# Patient Record
Sex: MALE | Race: WHITE | HISPANIC OR LATINO | Employment: FULL TIME | URBAN - METROPOLITAN AREA
[De-identification: names, ages, dates, MRNs, and addresses within clinical notes are randomized per-mention and may not be internally consistent; named-entity substitution may affect disease eponyms.]

---

## 2018-06-09 ENCOUNTER — APPOINTMENT (EMERGENCY)
Dept: CT IMAGING | Facility: HOSPITAL | Age: 48
End: 2018-06-09
Payer: COMMERCIAL

## 2018-06-09 ENCOUNTER — HOSPITAL ENCOUNTER (EMERGENCY)
Facility: HOSPITAL | Age: 48
Discharge: HOME | End: 2018-06-09
Attending: EMERGENCY MEDICINE | Admitting: EMERGENCY MEDICINE
Payer: COMMERCIAL

## 2018-06-09 VITALS
TEMPERATURE: 97.6 F | BODY MASS INDEX: 31.39 KG/M2 | OXYGEN SATURATION: 96 % | DIASTOLIC BLOOD PRESSURE: 57 MMHG | HEIGHT: 67 IN | SYSTOLIC BLOOD PRESSURE: 105 MMHG | HEART RATE: 74 BPM | RESPIRATION RATE: 16 BRPM | WEIGHT: 200 LBS

## 2018-06-09 DIAGNOSIS — R07.9 LEFT SIDED CHEST PAIN: Primary | ICD-10-CM

## 2018-06-09 LAB
ALBUMIN SERPL BCP-MCNC: 3.8 G/DL (ref 3.5–5)
ALP SERPL-CCNC: 64 U/L (ref 46–116)
ALT SERPL W P-5'-P-CCNC: 31 U/L (ref 12–78)
ANION GAP BLD CALC-SCNC: 18 MMOL/L (ref 4–13)
ANION GAP SERPL CALCULATED.3IONS-SCNC: 10 MMOL/L (ref 4–13)
APTT PPP: 33 SECONDS (ref 24–36)
AST SERPL W P-5'-P-CCNC: 22 U/L (ref 5–45)
BASOPHILS # BLD AUTO: 0.09 THOUSANDS/ΜL (ref 0–0.1)
BASOPHILS NFR BLD AUTO: 1 % (ref 0–1)
BILIRUB SERPL-MCNC: 0.4 MG/DL (ref 0.2–1)
BUN BLD-MCNC: 16 MG/DL (ref 5–25)
BUN SERPL-MCNC: 17 MG/DL (ref 5–25)
CA-I BLD-SCNC: 1.15 MMOL/L (ref 1.12–1.32)
CALCIUM SERPL-MCNC: 8.9 MG/DL (ref 8.3–10.1)
CHLORIDE BLD-SCNC: 99 MMOL/L (ref 100–108)
CHLORIDE SERPL-SCNC: 100 MMOL/L (ref 100–108)
CO2 SERPL-SCNC: 25 MMOL/L (ref 21–32)
CREAT BLD-MCNC: 1.1 MG/DL (ref 0.6–1.3)
CREAT SERPL-MCNC: 1.21 MG/DL (ref 0.6–1.3)
EOSINOPHIL # BLD AUTO: 0.22 THOUSAND/ΜL (ref 0–0.61)
EOSINOPHIL NFR BLD AUTO: 2 % (ref 0–6)
ERYTHROCYTE [DISTWIDTH] IN BLOOD BY AUTOMATED COUNT: 13 % (ref 11.6–15.1)
GFR SERPL CREATININE-BSD FRML MDRD: 71 ML/MIN/1.73SQ M
GFR SERPL CREATININE-BSD FRML MDRD: 80 ML/MIN/1.73SQ M
GLUCOSE SERPL-MCNC: 177 MG/DL (ref 65–140)
GLUCOSE SERPL-MCNC: 177 MG/DL (ref 65–140)
HCT VFR BLD AUTO: 44 % (ref 36.5–49.3)
HCT VFR BLD CALC: 45 % (ref 36.5–49.3)
HGB BLD-MCNC: 14.7 G/DL (ref 12–17)
HGB BLDA-MCNC: 15.3 G/DL (ref 12–17)
IMM GRANULOCYTES # BLD AUTO: 0.05 THOUSAND/UL (ref 0–0.2)
IMM GRANULOCYTES NFR BLD AUTO: 0 % (ref 0–2)
INR PPP: 1.02 (ref 0.86–1.17)
LIPASE SERPL-CCNC: 62 U/L (ref 73–393)
LYMPHOCYTES # BLD AUTO: 3.04 THOUSANDS/ΜL (ref 0.6–4.47)
LYMPHOCYTES NFR BLD AUTO: 25 % (ref 14–44)
MAGNESIUM SERPL-MCNC: 1.7 MG/DL (ref 1.6–2.6)
MCH RBC QN AUTO: 28.2 PG (ref 26.8–34.3)
MCHC RBC AUTO-ENTMCNC: 33.4 G/DL (ref 31.4–37.4)
MCV RBC AUTO: 85 FL (ref 82–98)
MONOCYTES # BLD AUTO: 0.73 THOUSAND/ΜL (ref 0.17–1.22)
MONOCYTES NFR BLD AUTO: 6 % (ref 4–12)
NEUTROPHILS # BLD AUTO: 8.08 THOUSANDS/ΜL (ref 1.85–7.62)
NEUTS SEG NFR BLD AUTO: 66 % (ref 43–75)
NRBC BLD AUTO-RTO: 0 /100 WBCS
PCO2 BLD: 25 MMOL/L (ref 21–32)
PLATELET # BLD AUTO: 301 THOUSANDS/UL (ref 149–390)
PMV BLD AUTO: 10.1 FL (ref 8.9–12.7)
POTASSIUM BLD-SCNC: 3.4 MMOL/L (ref 3.5–5.3)
POTASSIUM SERPL-SCNC: 3.3 MMOL/L (ref 3.5–5.3)
PROT SERPL-MCNC: 7.1 G/DL (ref 6.4–8.2)
PROTHROMBIN TIME: 13.3 SECONDS (ref 11.8–14.2)
RBC # BLD AUTO: 5.21 MILLION/UL (ref 3.88–5.62)
SODIUM BLD-SCNC: 137 MMOL/L (ref 136–145)
SODIUM SERPL-SCNC: 135 MMOL/L (ref 136–145)
SPECIMEN SOURCE: ABNORMAL
SPECIMEN SOURCE: NORMAL
TROPONIN I BLD-MCNC: 0 NG/ML (ref 0–0.08)
TROPONIN I SERPL-MCNC: <0.02 NG/ML
TROPONIN I SERPL-MCNC: <0.02 NG/ML
WBC # BLD AUTO: 12.21 THOUSAND/UL (ref 4.31–10.16)

## 2018-06-09 PROCEDURE — 84484 ASSAY OF TROPONIN QUANT: CPT

## 2018-06-09 PROCEDURE — 85014 HEMATOCRIT: CPT

## 2018-06-09 PROCEDURE — 96361 HYDRATE IV INFUSION ADD-ON: CPT

## 2018-06-09 PROCEDURE — 36415 COLL VENOUS BLD VENIPUNCTURE: CPT | Performed by: EMERGENCY MEDICINE

## 2018-06-09 PROCEDURE — 80047 BASIC METABLC PNL IONIZED CA: CPT

## 2018-06-09 PROCEDURE — 96375 TX/PRO/DX INJ NEW DRUG ADDON: CPT

## 2018-06-09 PROCEDURE — 93005 ELECTROCARDIOGRAM TRACING: CPT

## 2018-06-09 PROCEDURE — 71275 CT ANGIOGRAPHY CHEST: CPT

## 2018-06-09 PROCEDURE — 96374 THER/PROPH/DIAG INJ IV PUSH: CPT

## 2018-06-09 PROCEDURE — 85610 PROTHROMBIN TIME: CPT | Performed by: EMERGENCY MEDICINE

## 2018-06-09 PROCEDURE — 99285 EMERGENCY DEPT VISIT HI MDM: CPT

## 2018-06-09 PROCEDURE — 83690 ASSAY OF LIPASE: CPT | Performed by: EMERGENCY MEDICINE

## 2018-06-09 PROCEDURE — 83735 ASSAY OF MAGNESIUM: CPT | Performed by: EMERGENCY MEDICINE

## 2018-06-09 PROCEDURE — 80053 COMPREHEN METABOLIC PANEL: CPT | Performed by: EMERGENCY MEDICINE

## 2018-06-09 PROCEDURE — 74175 CTA ABDOMEN W/CONTRAST: CPT

## 2018-06-09 PROCEDURE — 84484 ASSAY OF TROPONIN QUANT: CPT | Performed by: EMERGENCY MEDICINE

## 2018-06-09 PROCEDURE — 85025 COMPLETE CBC W/AUTO DIFF WBC: CPT | Performed by: EMERGENCY MEDICINE

## 2018-06-09 PROCEDURE — 85730 THROMBOPLASTIN TIME PARTIAL: CPT | Performed by: EMERGENCY MEDICINE

## 2018-06-09 RX ORDER — POTASSIUM CHLORIDE 20 MEQ/1
40 TABLET, EXTENDED RELEASE ORAL ONCE
Status: COMPLETED | OUTPATIENT
Start: 2018-06-09 | End: 2018-06-09

## 2018-06-09 RX ORDER — ASPIRIN 81 MG/1
324 TABLET, CHEWABLE ORAL ONCE
Status: COMPLETED | OUTPATIENT
Start: 2018-06-09 | End: 2018-06-09

## 2018-06-09 RX ORDER — ONDANSETRON 2 MG/ML
4 INJECTION INTRAMUSCULAR; INTRAVENOUS ONCE
Status: COMPLETED | OUTPATIENT
Start: 2018-06-09 | End: 2018-06-09

## 2018-06-09 RX ADMIN — IOHEXOL 100 ML: 350 INJECTION, SOLUTION INTRAVENOUS at 14:03

## 2018-06-09 RX ADMIN — POTASSIUM CHLORIDE 40 MEQ: 1500 TABLET, EXTENDED RELEASE ORAL at 14:50

## 2018-06-09 RX ADMIN — SODIUM CHLORIDE 1000 ML: 0.9 INJECTION, SOLUTION INTRAVENOUS at 14:11

## 2018-06-09 RX ADMIN — NITROGLYCERIN 1 INCH: 20 OINTMENT TOPICAL at 14:52

## 2018-06-09 RX ADMIN — ASPIRIN 81 MG 324 MG: 81 TABLET ORAL at 14:49

## 2018-06-09 RX ADMIN — HYDROMORPHONE HYDROCHLORIDE 0.5 MG: 1 INJECTION, SOLUTION INTRAMUSCULAR; INTRAVENOUS; SUBCUTANEOUS at 13:55

## 2018-06-09 RX ADMIN — ONDANSETRON 4 MG: 2 INJECTION INTRAMUSCULAR; INTRAVENOUS at 15:01

## 2018-06-09 NOTE — ED CARE HANDOFF
Emergency Department Sign Out Note        Sign out and transfer of care from Dr Justin Benavides  See Separate Emergency Department note  The patient, Carine Roldan, was evaluated by the previous provider for chest pain  Workup Completed:  Results Reviewed     Procedure Component Value Units Date/Time    Troponin I [[id number]]  (Normal) Collected:  06/09/18 1702    Lab Status:  Final result Specimen:  Blood from Arm, Right Updated:  06/09/18 1726     Troponin I <0 02 ng/mL     Narrative:         Siemens Chemistry analyzer 99% cutoff is > 0 04 ng/mL in network labs    o cTnI 99% cutoff is useful only when applied to patients in the clinical setting of myocardial ischemia  o cTnI 99% cutoff should be interpreted in the context of clinical history, ECG findings and possibly cardiac imaging to establish correct diagnosis  o cTnI 99% cutoff may be suggestive but clearly not indicative of a coronary event without the clinical setting of myocardial ischemia  Troponin I [[id number]]  (Normal) Collected:  06/09/18 1412    Lab Status:  Final result Specimen:  Blood from Arm, Right Updated:  06/09/18 1438     Troponin I <0 02 ng/mL     Narrative:         Siemens Chemistry analyzer 99% cutoff is > 0 04 ng/mL in network labs    o cTnI 99% cutoff is useful only when applied to patients in the clinical setting of myocardial ischemia  o cTnI 99% cutoff should be interpreted in the context of clinical history, ECG findings and possibly cardiac imaging to establish correct diagnosis  o cTnI 99% cutoff may be suggestive but clearly not indicative of a coronary event without the clinical setting of myocardial ischemia      Comprehensive metabolic panel [[id number]]  (Abnormal) Collected:  06/09/18 1412    Lab Status:  Final result Specimen:  Blood from Arm, Right Updated:  06/09/18 1436     Sodium 135 (L) mmol/L      Potassium 3 3 (L) mmol/L      Chloride 100 mmol/L      CO2 25 mmol/L      Anion Gap 10 mmol/L      BUN 17 mg/dL Creatinine 1 21 mg/dL      Glucose 177 (H) mg/dL      Calcium 8 9 mg/dL      AST 22 U/L      ALT 31 U/L      Alkaline Phosphatase 64 U/L      Total Protein 7 1 g/dL      Albumin 3 8 g/dL      Total Bilirubin 0 40 mg/dL      eGFR 71 ml/min/1 73sq m     Narrative:         National Kidney Disease Education Program recommendations are as follows:  GFR calculation is accurate only with a steady state creatinine  Chronic Kidney disease less than 60 ml/min/1 73 sq  meters  Kidney failure less than 15 ml/min/1 73 sq  meters      Magnesium [[id number]]  (Normal) Collected:  06/09/18 1412    Lab Status:  Final result Specimen:  Blood from Arm, Right Updated:  06/09/18 1436     Magnesium 1 7 mg/dL     Lipase [[id number]]  (Abnormal) Collected:  06/09/18 1412    Lab Status:  Final result Specimen:  Blood from Arm, Right Updated:  06/09/18 1436     Lipase 62 (L) u/L     Protime-INR [[id number]]  (Normal) Collected:  06/09/18 1412    Lab Status:  Final result Specimen:  Blood from Arm, Right Updated:  06/09/18 1431     Protime 13 3 seconds      INR 1 02    APTT [[id number]]  (Normal) Collected:  06/09/18 1412    Lab Status:  Final result Specimen:  Blood from Arm, Right Updated:  06/09/18 1431     PTT 33 seconds     CBC and differential [[id number]]  (Abnormal) Collected:  06/09/18 1412    Lab Status:  Final result Specimen:  Blood from Arm, Right Updated:  06/09/18 1419     WBC 12 21 (H) Thousand/uL      RBC 5 21 Million/uL      Hemoglobin 14 7 g/dL      Hematocrit 44 0 %      MCV 85 fL      MCH 28 2 pg      MCHC 33 4 g/dL      RDW 13 0 %      MPV 10 1 fL      Platelets 415 Thousands/uL      nRBC 0 /100 WBCs      Neutrophils Relative 66 %      Immat GRANS % 0 %      Lymphocytes Relative 25 %      Monocytes Relative 6 %      Eosinophils Relative 2 %      Basophils Relative 1 %      Neutrophils Absolute 8 08 (H) Thousands/µL      Immature Grans Absolute 0 05 Thousand/uL      Lymphocytes Absolute 3 04 Thousands/µL      Monocytes Absolute 0 73 Thousand/µL      Eosinophils Absolute 0 22 Thousand/µL      Basophils Absolute 0 09 Thousands/µL     POCT troponin [[id number]] Collected:  06/09/18 1345    Lab Status:  Final result Updated:  06/09/18 1359     POC Troponin I 0 00 ng/ml      Specimen Type VENOUS    Narrative:         A[employer]ott i-Stat handheld analyzer 99% cutoff is > 0 08ng/mL in [facility] Emergency Dep[facility]    o cTnI 99% cutoff is useful only when applied to patients in the clinical setting of myocardial ischemia  o cTnI 99% cutoff should be interpreted in the context of clinical history, ECG findings and possibly cardiac imaging to establish correct diagnosis  o cTnI 99% cutoff may be suggestive but clearly not indicative of a coronary event without the clinical setting of myocardial ischemia  POCT Chem 8+ [[id number]]  (Abnormal) Collected:  06/09/18 1346    Lab Status:  Final result Updated:  06/09/18 1351     SODIUM, I-STAT 137 mmol/l      Potassium, i-STAT 3 4 (L) mmol/L      Chloride, istat 99 (L) mmol/L      CO2, i-STAT 25 mmol/L      Anion Gap, Istat 18 (H) mmol/L      Calcium, Ionized i-STAT 1 15 mmol/L      BUN, I-STAT 16 mg/dl      Creatinine, i-STAT 1 1 mg/dl      eGFR 80 ml/min/1 73sq m      Glucose, i-STAT 177 (H) mg/dl      Hct, i-STAT 45 %      Hgb, i-STAT 15 3 g/dl      Specimen Type VENOUS    UA w Reflex to Microscopic [[id number]]     Lab Status:  No result Specimen:  Urine     Rapid drug screen, urine [[id number]]     Lab Status:  No result Specimen:  Urine         CTA dissection protocol chest and abdomen   Final Result      Normal aorta  Hepatic steatosis           Workstation performed: [id number]               ED Course / Workup Pending (followup):  Repeat serial troponin / EKG                              Procedures  MDM  Number of Diagnoses or Management Options  Left sided chest pain:   Diagnosis management comments: EKG Interpretation    Rate: 81 BPM  Rhythm: NSR  Axis: normal   Intervals: Normal, no blocks, QTc 415ms  Q waves: no  T waves: inverted in III   ST segments: no depression / elevation    Impression: non specific EKG  EKG for comparison: 6/9/18 - no change from prior obtained 3h earlier  EKG interpreted by me  Pt re-examined by myself when the EKG / trop returned  Repeat trop negative  Repeat EKG unchanged  He has had no recurrent CP while here  He feels comfortable w plan to est care w PCP, schedule stress test  Return parameters discussed re: ACS sx and pt understands  Return parameters discussed  Pt requires f/u as an outpt  Pt expresses understanding w above treatment plan  All questions answered prior to d/c  CritCare Time      Disposition  Final diagnoses:   Left sided chest pain     Time reflects when diagnosis was documented in both MDM as applicable and the Disposition within this note     Time User Action Codes Description Comment    6/9/2018  4:45 PM Shahzad Packer [R07 9] Left sided chest pain       ED Disposition     ED Disposition Condition Comment    Discharge  Jess Butcher discharge to home/self care  Condition at discharge: stable          Follow-up Information     Follow up With Specialties Details Why Contact Info Additional Information    Infolink  Call to establish care with a primary care physician [address], DO Internal Medicine Schedule an appointment as soon as possible for a visit to establish care with a primary care doctor [address] Emergency Department Emergency Medicine Go to If symptoms worsen 34 [facility] [address] [place] ED, [address], [place], [address]        There are no discharge medications for this patient  Outpatient Discharge Orders  Stress test only, exercise   Standing Status: Future  Standing Exp   Date: 06/09/22            ED Provider  Electronically Signed by     Shelia Head [place]  06/09/18 2000 [facility],[address], [place]  06/09/18 5850

## 2018-06-09 NOTE — ED NOTES
Discharge instructions reviewed, patient has no new complaints or concerns at time of discharge  Patient ambulated out of department, steady gait, vss  Patient accompanied out of department by his family        Angely Pina RN  06/09/18 5019

## 2018-06-09 NOTE — ED PROVIDER NOTES
History  Chief Complaint   Patient presents with    Chest Pain     patient presents with left sided chest pain with radiation down the left arm that started 20 minutes ago  patient also admits to being SOB  Patient is a 81-year-old male with no reported medical history, presents to the emergency department complaining of acute onset of left-sided chest pain that started about 20 minutes ago  Patient states the pain feels like an "explosion in his chest," and it radiates down the left upper extremity as well as into the left upper abdomen  He denies ever having this type of pain before  He does report feeling mildly short of breath  He also reports associated tingling in the right hand  He denies any associated diaphoresis, headache, dizziness or near syncope, visual disturbance, neck or jaw pain, palpitations, abdominal distension, recent URI symptoms, cough, hemoptysis, nausea, vomiting, diarrhea, constipation, urinary symptoms, skin rash or color change, extremity swelling or pain, extremity weakness, slurring of speech, facial asymmetry or other stroke-like symptoms  He denies taking any medications daily  He does admit to smoking cigars about once a week  He also admits that about 1 hour ago he ingested an edible marijuana candy while playing golf with friends  He denies any cocaine, amphetamine or other drug use  Denies any significant family history of cardiac disease  History provided by:  Patient and spouse   used: No        None       Past Medical History:   Diagnosis Date    Hyperlipidemia     Hypertension        History reviewed  No pertinent surgical history  History reviewed  No pertinent family history  I have reviewed and agree with the history as documented      Social History   Substance Use Topics    Smoking status: Current Every Day Smoker     Types: Cigars    Smokeless tobacco: Never Used    Alcohol use Yes      Comment: socailly         Review of Systems   Constitutional: Negative for chills, diaphoresis and fever  HENT: Negative for congestion, ear pain, rhinorrhea and sore throat  Eyes: Negative for photophobia, pain and visual disturbance  Respiratory: Negative for cough, chest tightness, shortness of breath and wheezing  Cardiovascular: Positive for chest pain  Negative for palpitations and leg swelling  Gastrointestinal: Negative for abdominal pain, constipation, diarrhea, nausea and vomiting  Genitourinary: Negative for dysuria, flank pain, frequency and hematuria  Musculoskeletal: Negative for back pain, neck pain and neck stiffness  Skin: Negative for color change, pallor and rash  Allergic/Immunologic: Negative for immunocompromised state  Neurological: Positive for numbness  Negative for dizziness, syncope, facial asymmetry, speech difficulty, weakness, light-headedness and headaches  Hematological: Negative for adenopathy  Does not bruise/bleed easily  Psychiatric/Behavioral: Negative for confusion and decreased concentration  All other systems reviewed and are negative  Physical Exam  Physical Exam   Constitutional: He is oriented to person, place, and time  He appears well-developed and well-nourished  No distress  HENT:   Head: Normocephalic and atraumatic  Mouth/Throat: Oropharynx is clear and moist  No oropharyngeal exudate  Eyes: Conjunctivae and EOM are normal  Pupils are equal, round, and reactive to light  Neck: Normal range of motion  Neck supple  No JVD present  Cardiovascular: Normal rate, regular rhythm, normal heart sounds and intact distal pulses  Exam reveals no gallop and no friction rub  No murmur heard  Pulmonary/Chest: Effort normal and breath sounds normal  No respiratory distress  He has no wheezes  He has no rales  He exhibits no tenderness  Abdominal: Soft  Bowel sounds are normal  He exhibits no distension  There is no tenderness  There is no rebound and no guarding  Musculoskeletal: Normal range of motion  He exhibits no edema or tenderness  Lymphadenopathy:     He has no cervical adenopathy  Neurological: He is alert and oriented to person, place, and time  No cranial nerve deficit  No gross motor or sensory deficits  5/5 strength throughout  Skin: Skin is warm and dry  No rash noted  He is not diaphoretic  No pallor  Psychiatric: He has a normal mood and affect  His behavior is normal    Nursing note and vitals reviewed        Vital Signs  ED Triage Vitals   Temperature Pulse Respirations Blood Pressure SpO2   06/09/18 1443 06/09/18 1339 06/09/18 1339 06/09/18 1340 06/09/18 1339   97 6 °F (36 4 °C) (!) 110 20 139/65 95 %      Temp Source Heart Rate Source Patient Position - Orthostatic VS BP Location FiO2 (%)   06/09/18 1443 06/09/18 1702 06/09/18 1530 06/09/18 1530 --   Oral Monitor Lying Right arm       Pain Score       06/09/18 1444       8         Vitals:    06/09/18 1443 06/09/18 1530 06/09/18 1702 06/09/18 1730   BP:  107/55 106/51 105/57   BP Location:  Right arm Left arm Left arm   Pulse:  78 78 74   Resp:  18 16 16   Temp: 97 6 °F (36 4 °C)      TempSrc: Oral      SpO2:  95% 96% 96%   Weight: 90 7 kg (200 lb)      Height: 5' 7" (1 702 m)        Visual Acuity      ED Medications  Medications   sodium chloride 0 9 % bolus 1,000 mL (0 mL Intravenous Stopped 6/9/18 1511)   HYDROmorphone (DILAUDID) injection 0 5 mg (0 5 mg Intravenous Given 6/9/18 1355)   iohexol (OMNIPAQUE) 350 MG/ML injection (MULTI-DOSE) 100 mL (100 mL Intravenous Given 6/9/18 1403)   aspirin chewable tablet 324 mg (324 mg Oral Given 6/9/18 1449)   nitroglycerin (NITRO-BID) 2 % TD ointment 1 inch (1 inch Topical Given 6/9/18 1452)   potassium chloride (K-DUR,KLOR-CON) CR tablet 40 mEq (40 mEq Oral Given 6/9/18 1450)   ondansetron (ZOFRAN) injection 4 mg (4 mg Intravenous Given 6/9/18 2061)       Diagnostic Studies  Results Reviewed     Procedure Component Value Units Date/Time    Troponin I [[id number]]  (Normal) Collected:  06/09/18 1702    Lab Status:  Final result Specimen:  Blood from Arm, Right Updated:  06/09/18 1726     Troponin I <0 02 ng/mL     Narrative:         [employer] Chemistry analyzer 99% cutoff is > 0 04 ng/mL in network labs    o cTnI 99% cutoff is useful only when applied to patients in the clinical setting of myocardial ischemia  o cTnI 99% cutoff should be interpreted in the context of clinical history, ECG findings and possibly cardiac imaging to establish correct diagnosis  o cTnI 99% cutoff may be suggestive but clearly not indicative of a coronary event without the clinical setting of myocardial ischemia  Troponin I [[id number]]  (Normal) Collected:  06/09/18 1412    Lab Status:  Final result Specimen:  Blood from Arm, Right Updated:  06/09/18 1438     Troponin I <0 02 ng/mL     Narrative:         [employer]ens Chemistry analyzer 99% cutoff is > 0 04 ng/mL in network labs    o cTnI 99% cutoff is useful only when applied to patients in the clinical setting of myocardial ischemia  o cTnI 99% cutoff should be interpreted in the context of clinical history, ECG findings and possibly cardiac imaging to establish correct diagnosis  o cTnI 99% cutoff may be suggestive but clearly not indicative of a coronary event without the clinical setting of myocardial ischemia      Comprehensive metabolic panel [[id number]]  (Abnormal) Collected:  06/09/18 1412    Lab Status:  Final result Specimen:  Blood from Arm, Right Updated:  06/09/18 1436     Sodium 135 (L) mmol/L      Potassium 3 3 (L) mmol/L      Chloride 100 mmol/L      CO2 25 mmol/L      Anion Gap 10 mmol/L      BUN 17 mg/dL      Creatinine 1 21 mg/dL      Glucose 177 (H) mg/dL      Calcium 8 9 mg/dL      AST 22 U/L      ALT 31 U/L      Alkaline Phosphatase 64 U/L      Total Protein 7 1 g/dL      Albumin 3 8 g/dL      Total Bilirubin 0 40 mg/dL      eGFR 71 ml/min/1 73sq m     Narrative:         National Kidney Disease Education Program recommendations are as follows:  GFR calculation is accurate only with a steady state creatinine  Chronic Kidney disease less than 60 ml/min/1 73 sq  meters  Kidney failure less than 15 ml/min/1 73 sq  meters      Magnesium [[id number]]  (Normal) Collected:  06/09/18 1412    Lab Status:  Final result Specimen:  Blood from Arm, Right Updated:  06/09/18 1436     Magnesium 1 7 mg/dL     Lipase [[id number]]  (Abnormal) Collected:  06/09/18 1412    Lab Status:  Final result Specimen:  Blood from Arm, Right Updated:  06/09/18 1436     Lipase 62 (L) u/L     Protime-INR [[id number]]  (Normal) Collected:  06/09/18 1412    Lab Status:  Final result Specimen:  Blood from Arm, Right Updated:  06/09/18 1431     Protime 13 3 seconds      INR 1 02    APTT [[id number]]  (Normal) Collected:  06/09/18 1412    Lab Status:  Final result Specimen:  Blood from Arm, Right Updated:  06/09/18 1431     PTT 33 seconds     CBC and differential [[id number]]  (Abnormal) Collected:  06/09/18 1412    Lab Status:  Final result Specimen:  Blood from Arm, Right Updated:  06/09/18 1419     WBC 12 21 (H) Thousand/uL      RBC 5 21 Million/uL      Hemoglobin 14 7 g/dL      Hematocrit 44 0 %      MCV 85 fL      MCH 28 2 pg      MCHC 33 4 g/dL      RDW 13 0 %      MPV 10 1 fL      Platelets 427 Thousands/uL      nRBC 0 /100 WBCs      Neutrophils Relative 66 %      Immat GRANS % 0 %      Lymphocytes Relative 25 %      Monocytes Relative 6 %      Eosinophils Relative 2 %      Basophils Relative 1 %      Neutrophils Absolute 8 08 (H) Thousands/µL      Immature Grans Absolute 0 05 Thousand/uL      Lymphocytes Absolute 3 04 Thousands/µL      Monocytes Absolute 0 73 Thousand/µL      Eosinophils Absolute 0 22 Thousand/µL      Basophils Absolute 0 09 Thousands/µL     POCT troponin [[id number]] Collected:  06/09/18 1345    Lab Status:  Final result Updated:  06/09/18 1359     POC Troponin I 0 00 ng/ml      Specimen Type VENOUS    Narrative:         [employer] i-Stat handheld analyzer 99% cutoff is > 0 08ng/mL in [facility] Emergency Departments    o cTnI 99% cutoff is useful only when applied to patients in the clinical setting of myocardial ischemia  o cTnI 99% cutoff should be interpreted in the context of clinical history, ECG findings and possibly cardiac imaging to establish correct diagnosis  o cTnI 99% cutoff may be suggestive but clearly not indicative of a coronary event without the clinical setting of myocardial ischemia  POCT Chem 8+ [[id number]]  (Abnormal) Collected:  06/09/18 1346    Lab Status:  Final result Updated:  06/09/18 1351     SODIUM, I-STAT 137 mmol/l      Potassium, i-STAT 3 4 (L) mmol/L      Chloride, istat 99 (L) mmol/L      CO2, i-STAT 25 mmol/L      Anion Gap, Istat 18 (H) mmol/L      Calcium, Ionized i-STAT 1 15 mmol/L      BUN, I-STAT 16 mg/dl      Creatinine, i-STAT 1 1 mg/dl      eGFR 80 ml/min/1 73sq m      Glucose, i-STAT 177 (H) mg/dl      Hct, i-STAT 45 %      Hgb, i-STAT 15 3 g/dl      Specimen Type VENOUS                 CTA dissection protocol chest and abdomen   Final Result by Kelly Ramírez MD (06/09 1436)      Normal aorta  Hepatic steatosis  Workstation performed: [id number]                    Procedures  ECG 12 Lead Documentation  Date/Time: 6/9/2018 1:58 PM  Performed by: Rod Bell  Authorized by: Rod Bell     ECG reviewed by me, the ED Provider: yes    Patient location:  ED  Previous ECG:     Previous ECG:  Unavailable  Rate:     ECG rate:  103    ECG rate assessment: tachycardic    Rhythm:     Rhythm: sinus tachycardia    Ectopy:     Ectopy: none    QRS:     QRS axis:  Normal    QRS intervals:  Normal  Conduction:     Conduction: normal    ST segments:     ST segments:  Normal  T waves:     T waves: normal             Phone Contacts  ED Phone Contact    ED Course  ED Course as of Rasta 10 0704   Sat Jun 09, 2018   1502 Patient just had an episode of vomiting  Will give 4 mg of IV Zofran    He was not feeling nauseated prior to being given the Dilaudid so likely it was from that  He does feel some relief after the nitroglycerin paste has been applied to his chest     1638 Patient reassessed and feeling a lot better  Chest pain is minimal and intermittent  Awaiting repeat troponin and EKG  Patient confirms that he does not have any prior medical problems and although his records show history of hypertension and hyperlipidemia he denies this  Will send home with a script for cardiac stress test and referral to PCP     0383 Signed patient out to TERESA Macias who is to f/u repeat troponin/EKG and if there is acute change, will admit, or if normal/unchanged, discharge with script for stress test and PCP referral                                 MDM  Number of Diagnoses or Management Options  Diagnosis management comments: 50-year-old male who presents with acute onset left chest pain radiating to abdomen and left upper extremity associated with right upper extremity paresthesia  Patient has no focal neurologic deficits  He is hemodynamically stable, not diaphoretic and in no acute distress  Differential includes anxiety, side effect from recent drug ingestion, acute coronary syndrome, aortic dissection, less likely pulmonary embolism but still in the differential, musculoskeletal pain  Will workup with cardiac labs and a CTA dissection protocol  Will give IV fluid bolus, 0 5 mg of IV Dilaudid  Patient's blood pressure currently controlled  If CTA negative, will add aspirin and nitropaste  Will likely keep for delta troponin/EKG given that patient is relatively healthy         Amount and/or Complexity of Data Reviewed  Clinical lab tests: ordered and reviewed  Tests in the radiology section of CPT®: ordered and reviewed  Tests in the medicine section of CPT®: ordered and reviewed  Obtain history from someone other than the patient: yes  Independent visualization of images, tracings, or specimens: yes      Romana Time    Disposition  Final diagnoses:   Left sided chest pain     Time reflects when diagnosis was documented in both MDM as applicable and the Disposition within this note     Time User Action Codes Description Comment    6/9/2018  4:45 PM Eric Wilson ROX Add [R07 9] Left sided chest pain       ED Disposition     ED Disposition Condition Comment    Discharge  Neil Herbert discharge to home/self care  Condition at discharge: stable          Follow-up Information     Follow up With Specialties Details Why Contact Info Additional Information    Infolink  Call to establish care with a primary care physician [address], DO Internal Medicine Schedule an appointment as soon as possible for a visit to establish care with a primary care doctor [address] Emergency Department Emergency Medicine Go to If symptoms worsen 34 [facility] [address] [place] ED, [address] [place], [place], [facility]          There are no discharge medications for this patient  Outpatient Discharge Orders  Stress test only, exercise   Standing Status: Future  Standing Exp   Date: 06/09/22         ED Provider  Electronically Signed by           Agustin Nichols DO  06/10/18 0960

## 2018-06-09 NOTE — DISCHARGE INSTRUCTIONS
Chest Pain   WHAT YOU NEED TO KNOW:   Chest pain can be caused by a range of conditions, from not serious to life-threatening  Chest pain can be a symptom of a digestive problem, such as acid reflux or a stomach ulcer  An anxiety attack or a strong emotion, such as anger, can also cause chest pain  Infection, inflammation, or a fracture in the bones or cartilage in your chest can cause pain or discomfort  Sometimes chest pain or pressure is caused by poor blood flow to your heart (angina)  Chest pain may also be caused by life-threatening conditions such as a heart attack or blood clot in your lungs  DISCHARGE INSTRUCTIONS:   Call 911 if:   · You have any of the following signs of a heart attack:      ¨ Squeezing, pressure, or pain in your chest that lasts longer than 5 minutes or returns    ¨ Discomfort or pain in your back, neck, jaw, stomach, or arm     ¨ Trouble breathing    ¨ Nausea or vomiting    ¨ Lightheadedness or a sudden cold sweat, especially with chest pain or trouble breathing    Seek care immediately if:   · You have chest discomfort that gets worse, even with medicine  · You cough or vomit blood  · Your bowel movements are black or bloody  · You cannot stop vomiting, or it hurts to swallow  Contact your healthcare provider if:   · You have questions or concerns about your condition or care  Medicines:   · Medicines  may be given to treat the cause of your chest pain  Examples include pain medicine, anxiety medicine, or medicines to increase blood flow to your heart  · Do not take certain medicines without asking your healthcare provider first   These include NSAIDs, herbal or vitamin supplements, or hormones (estrogen or progestin)  · Take your medicine as directed  Contact your healthcare provider if you think your medicine is not helping or if you have side effects  Tell him or her if you are allergic to any medicine   Keep a list of the medicines, vitamins, and herbs you take  Include the amounts, and when and why you take them  Bring the list or the pill bottles to follow-up visits  Carry your medicine list with you in case of an emergency  Follow up with your healthcare provider within 72 hours, or as directed: You may need to return for more tests to find the cause of your chest pain  You may be referred to a specialist, such as a cardiologist or gastroenterologist  Write down your questions so you remember to ask them during your visits  Healthy living tips: The following are general healthy guidelines  If your chest pain is caused by a heart problem, your healthcare provider will give you specific guidelines to follow  · Do not smoke  Nicotine and other chemicals in cigarettes and cigars can cause lung and heart damage  Ask your healthcare provider for information if you currently smoke and need help to quit  E-cigarettes or smokeless tobacco still contain nicotine  Talk to your healthcare provider before you use these products  · Eat a variety of healthy, low-fat foods  Healthy foods include fruits, vegetables, whole-grain breads, low-fat dairy products, beans, lean meats, and fish  Ask for more information about a heart healthy diet  · Ask about activity  Your healthcare provider will tell you which activities to limit or avoid  Ask when you can drive, return to work, and have sex  Ask about the best exercise plan for you  · Maintain a healthy weight  Ask your healthcare provider how much you should weigh  Ask him or her to help you create a weight loss plan if you are overweight  © 2017 [employer] Information is for End User's use only and may not be sold, redistributed or otherwise used for commercial purposes  All illustrations and images included in CareNotes® are the copyrighted property of [employer] , [employer]  or Bonilla Gallardo  The above information is an  only   It is not intended as medical advice for individual conditions or treatments  Talk to your doctor, nurse or pharmacist before following any medical regimen to see if it is safe and effective for you

## 2018-06-10 LAB
ATRIAL RATE: 103 BPM
ATRIAL RATE: 81 BPM
P AXIS: 52 DEGREES
P AXIS: 73 DEGREES
PR INTERVAL: 166 MS
PR INTERVAL: 174 MS
QRS AXIS: 52 DEGREES
QRS AXIS: 64 DEGREES
QRSD INTERVAL: 102 MS
QRSD INTERVAL: 92 MS
QT INTERVAL: 336 MS
QT INTERVAL: 358 MS
QTC INTERVAL: 415 MS
QTC INTERVAL: 440 MS
T WAVE AXIS: 17 DEGREES
T WAVE AXIS: 31 DEGREES
VENTRICULAR RATE: 103 BPM
VENTRICULAR RATE: 81 BPM

## 2018-06-10 PROCEDURE — 93010 ELECTROCARDIOGRAM REPORT: CPT | Performed by: INTERNAL MEDICINE

## 2020-12-17 ENCOUNTER — NURSE TRIAGE (OUTPATIENT)
Dept: OTHER | Facility: OTHER | Age: 50
End: 2020-12-17

## 2020-12-17 DIAGNOSIS — Z20.828 SARS-ASSOCIATED CORONAVIRUS EXPOSURE: ICD-10-CM

## 2020-12-17 DIAGNOSIS — Z20.828 SARS-ASSOCIATED CORONAVIRUS EXPOSURE: Primary | ICD-10-CM

## 2020-12-17 PROCEDURE — U0003 INFECTIOUS AGENT DETECTION BY NUCLEIC ACID (DNA OR RNA); SEVERE ACUTE RESPIRATORY SYNDROME CORONAVIRUS 2 (SARS-COV-2) (CORONAVIRUS DISEASE [COVID-19]), AMPLIFIED PROBE TECHNIQUE, MAKING USE OF HIGH THROUGHPUT TECHNOLOGIES AS DESCRIBED BY CMS-2020-01-R: HCPCS | Performed by: FAMILY MEDICINE

## 2020-12-18 LAB — SARS-COV-2 RNA SPEC QL NAA+PROBE: NOT DETECTED

## 2021-03-30 ENCOUNTER — HOSPITAL ENCOUNTER (EMERGENCY)
Facility: HOSPITAL | Age: 51
Discharge: HOME | End: 2021-03-30
Attending: EMERGENCY MEDICINE | Admitting: EMERGENCY MEDICINE
Payer: COMMERCIAL

## 2021-03-30 ENCOUNTER — APPOINTMENT (EMERGENCY)
Dept: RADIOLOGY | Facility: HOSPITAL | Age: 51
End: 2021-03-30
Payer: COMMERCIAL

## 2021-03-30 VITALS
BODY MASS INDEX: 33.11 KG/M2 | WEIGHT: 206 LBS | HEIGHT: 66 IN | TEMPERATURE: 98.7 F | OXYGEN SATURATION: 96 % | DIASTOLIC BLOOD PRESSURE: 71 MMHG | SYSTOLIC BLOOD PRESSURE: 140 MMHG | HEART RATE: 85 BPM | RESPIRATION RATE: 16 BRPM

## 2021-03-30 DIAGNOSIS — M79.631 RIGHT FOREARM PAIN: Primary | ICD-10-CM

## 2021-03-30 PROCEDURE — 73090 X-RAY EXAM OF FOREARM: CPT

## 2021-03-30 PROCEDURE — 99283 EMERGENCY DEPT VISIT LOW MDM: CPT

## 2021-03-30 PROCEDURE — 99284 EMERGENCY DEPT VISIT MOD MDM: CPT | Performed by: EMERGENCY MEDICINE

## 2021-03-30 RX ORDER — NAPROXEN 250 MG/1
250 TABLET ORAL 2 TIMES DAILY WITH MEALS
Qty: 20 TABLET | Refills: 0 | Status: SHIPPED | OUTPATIENT
Start: 2021-03-30

## 2021-03-30 NOTE — Clinical Note
Maira Villegas was seen and treated in our emergency department on 3/30/2021  No lifting more than 5 lbs with right arm until cleared by physician    Diagnosis: R forearm injury    Josie Kraft  is off the rest of the shift today  He may return on this date: If you have any questions or concerns, please don't hesitate to call        Mauro Damian MD    ______________________________           _______________          _______________  Hospital Representative                              Date                                Time

## 2021-03-30 NOTE — ED PROVIDER NOTES
Pt Name: Skyler Mederos  MRN: [id number]  [place] 1970  Age/Sex: 53 y o  male  Date of evaluation: 3/30/2021  PCP: No primary care provider on file  CHIEF COMPLAINT    Chief Complaint   Patient presents with    Arm Injury     pt states he injured arm while picking up wood yesterday; he says he heard two pops; he denies pain but he states he is having trouble picking things up         HPI    53 y o  male presenting with right arm pain and swelling  Patient states that he is picking up a large piece of wood yesterday when he heard 2 pops and had sudden pain in the right forearm  He states he has had continued pain as well as difficulty picking up objects or grasping things  He also notes that a bruise has appeared on his upper forearm today at the site of the pain  Pain is mild, dull, radiating through the forearm, worse with grasping something and absent at rest   He denies any other pain or injuries  HPI      Past Medical and Surgical History    Past Medical History:   Diagnosis Date    Hyperlipidemia     Hypertension        History reviewed  No pertinent surgical history  History reviewed  No pertinent family history  Social History     Tobacco Use    Smoking status: Current Every Day Smoker     Types: Cigars    Smokeless tobacco: Never Used   Substance Use Topics    Alcohol use: Yes     Comment: socailly     Drug use: No           Allergies    No Known Allergies    Home Medications    Prior to Admission medications    Not on File           Review of Systems    Review of Systems   Constitutional: Negative for appetite change, chills and diaphoresis  HENT: Negative for drooling, facial swelling, trouble swallowing and voice change  Respiratory: Negative for apnea, shortness of breath and wheezing  Cardiovascular: Negative for chest pain and leg swelling  Gastrointestinal: Negative for abdominal distention, abdominal pain, diarrhea, nausea and vomiting     Genitourinary: Negative for dysuria and urgency  Musculoskeletal: Positive for myalgias  Negative for arthralgias, back pain, gait problem and neck pain  Skin: Negative for color change, rash and wound  Neurological: Negative for seizures, speech difficulty, weakness and headaches  Psychiatric/Behavioral: Negative for agitation, behavioral problems and dysphoric mood  The patient is not nervous/anxious  All other systems reviewed and negative  Physical Exam      ED Triage Vitals [03/30/21 1405]   Temperature Pulse Respirations Blood Pressure SpO2   98 7 °F (37 1 °C) 85 16 140/71 96 %      Temp Source Heart Rate Source Patient Position - Orthostatic VS BP Location FiO2 (%)   Oral Monitor Sitting Left arm --      Pain Score       No Pain               Physical Exam  Vitals signs and nursing note reviewed  Constitutional:       Appearance: He is well-developed  HENT:      Head: Normocephalic and atraumatic  Eyes:      Conjunctiva/sclera: Conjunctivae normal       Pupils: Pupils are equal, round, and reactive to light  Neck:      Musculoskeletal: Normal range of motion and neck supple  Trachea: No tracheal deviation  Cardiovascular:      Rate and Rhythm: Normal rate and regular rhythm  Heart sounds: Normal heart sounds  No murmur  Pulmonary:      Effort: Pulmonary effort is normal  No respiratory distress  Breath sounds: Normal breath sounds  No stridor  No wheezing or rales  Abdominal:      General: There is no distension  Palpations: Abdomen is soft  Tenderness: There is no abdominal tenderness  There is no guarding or rebound  Musculoskeletal: Normal range of motion  General: Swelling and tenderness present  No deformity  Comments: Swelling , bruising, tenderness to palpation noted to the forearm immediately distal to the antecubital fossa  Pain reproduced with grasping with the right hand  No significant bony tenderness     Skin:     General: Skin is warm and dry  Findings: No rash  Neurological:      Mental Status: He is alert and oriented to person, place, and time  Psychiatric:         Behavior: Behavior normal          Thought Content: Thought content normal          Judgment: Judgment normal               Diagnostic Results      Labs:    Results Reviewed     None          All labs reviewed and utilized in the medical decision making process    Radiology:    XR forearm 2 views RIGHT   ED Interpretation   No acute fracture or dislocation  Soft tissue swelling noted distal to elbow          All radiology studies independently viewed by me and interpreted by the radiologist     Procedure    Procedures        ED Course of Care and Re-Assessments      Medications - No data to display        FINAL IMPRESSION    Final diagnoses:   Right forearm pain         DISPOSITION/PLAN    Right forearm pain and bruising as above  Vital signs reassuring, examination remarkable findings as above  Plain films reassuring  Very low suspicion for unstable fracture dislocation, some concern for possible muscle tear tendon rupture based on difficulty holding things well as pain reproduced with movement as above  Treated symptomatically, discharged strict return precautions, follow up Hand surgery due to weakness and concern for tendon injury  Time reflects when diagnosis was documented in both MDM as applicable and the Disposition within this note     Time User Action Codes Description Comment    3/30/2021  2:44 PM Yeni Singh Add [Y61 131] Right forearm pain       ED Disposition     ED Disposition Condition Date/Time Comment    Discharge Stable Tue Mar 30, 2021  2:44 PM Dereck Love discharge to home/self care              Follow-up Information     Follow up With Specialties Details Why Contact Info Additional [address] [facility] Emergency Medicine Go to  If symptoms worsen [address] [facility] [address] [facility], [address] [facility], [facility], [address], MD Orthopedic Surgery, Hand Surgery, Orthopedics Call in 1 day To discuss this visit and schedule close outpatient follow-up [address] [facility]  [address]  [facility] [address]  [phone]               PATIENT REFERRED TO:    [address] [facility] Emergency Department  [address] [place] [address] [phone]  Go to   If symptoms worsen    Ken Pitt MD  [address] [facility] [address]  [facility] [phone]    Call in 1 day  To discuss this visit and schedule close outpatient follow-up      DISCHARGE MEDICATIONS:    Discharge Medication List as of 3/30/2021  2:47 PM      START taking these medications    Details   naproxen (NAPROSYN) 250 mg tablet Take 1 tablet (250 mg total) by mouth 2 (two) times a day with meals, Starting Tue 3/30/2021, Print             No discharge procedures on file           MD Opal Granger MD  03/30/21 1640

## 2021-04-05 DIAGNOSIS — Z23 ENCOUNTER FOR IMMUNIZATION: ICD-10-CM

## 2021-04-07 ENCOUNTER — IMMUNIZATIONS (OUTPATIENT)
Dept: FAMILY MEDICINE CLINIC | Facility: HOSPITAL | Age: 51
End: 2021-04-07

## 2021-04-07 DIAGNOSIS — Z23 ENCOUNTER FOR IMMUNIZATION: Primary | ICD-10-CM

## 2021-04-07 PROCEDURE — 0001A SARS-COV-2 / COVID-19 MRNA VACCINE (PFIZER-BIONTECH) 30 MCG: CPT

## 2021-04-07 PROCEDURE — 91300 SARS-COV-2 / COVID-19 MRNA VACCINE (PFIZER-BIONTECH) 30 MCG: CPT

## 2021-04-28 ENCOUNTER — IMMUNIZATIONS (OUTPATIENT)
Dept: FAMILY MEDICINE CLINIC | Facility: HOSPITAL | Age: 51
End: 2021-04-28

## 2021-04-28 DIAGNOSIS — Z23 ENCOUNTER FOR IMMUNIZATION: Primary | ICD-10-CM

## 2021-04-28 PROCEDURE — 0002A SARS-COV-2 / COVID-19 MRNA VACCINE (PFIZER-BIONTECH) 30 MCG: CPT | Performed by: FAMILY MEDICINE

## 2021-04-28 PROCEDURE — 91300 SARS-COV-2 / COVID-19 MRNA VACCINE (PFIZER-BIONTECH) 30 MCG: CPT | Performed by: FAMILY MEDICINE
